# Patient Record
Sex: FEMALE
[De-identification: names, ages, dates, MRNs, and addresses within clinical notes are randomized per-mention and may not be internally consistent; named-entity substitution may affect disease eponyms.]

---

## 2023-03-16 PROBLEM — Z00.00 ENCOUNTER FOR PREVENTIVE HEALTH EXAMINATION: Status: ACTIVE | Noted: 2023-03-16

## 2023-03-21 ENCOUNTER — APPOINTMENT (OUTPATIENT)
Dept: NEUROSURGERY | Facility: CLINIC | Age: 58
End: 2023-03-21
Payer: SELF-PAY

## 2023-03-21 DIAGNOSIS — H93.A9 PULSATILE TINNITUS, UNSPECIFIED EAR: ICD-10-CM

## 2023-03-21 PROCEDURE — EDU01: CPT

## 2023-03-26 NOTE — REASON FOR VISIT
[Home] : at home, [unfilled] , at the time of the educational consult. [Medical Office: ([facility] [place])___] : at the medical office located in  [Participant] : the participant [Self] : self [New Patient Visit] : a new patient visit [FreeTextEntry1] : Pulsatile Tinnitus

## 2023-03-26 NOTE — ASSESSMENT
[FreeTextEntry1] : Impression:\par "Central" Pulsatile Tinnitus\par No change with ipsilateral neck pressure\par Throbbing in the Occipital Area\par Feeling of Pressure in the Head\par \par We discussed possible causes of pulsatile tinnitus including:\par ENT causes such as semicircular canal dehiscence, tumor, ototoxicity\par Cardiac causes such as aortic stenosis, valve disease, HTN, other causes of heart murmur\par Anemia\par Thyroid disease\par Cerebrovascular causes such as arteriovenous malformation (AVM), dural arteriovenous fistula (dAVF), venous sinus stenosis, venous aneurysm, large trans-mastoid emissary vein, carotid stenosis, jugular bulb diverticulum \par \par MRV Head 3/2023 reveals no venous sinus stenosis\par MR Head 3/2022 reveals no tumor, stroke, hemorrhage\par MRA wo 10/2022 reveals no dural AVF or AVM\par \par The symptoms that she describes is rarely an underlying cerebrovascular issue and is almost always caused by a musculoskeletal problem.  I reassured her that there is no life threatening cause of the throbbing on her her prior imaging.  I recommend pursuing physical therapy for her cervical spine, other possibilities include acupuncture, chiropractor and botox treatments.  \par \par Recommendations:\par Improve Cervical Spine posture - Consider PT\par Follow Up with Me As Needed